# Patient Record
Sex: FEMALE | Race: WHITE | NOT HISPANIC OR LATINO
[De-identification: names, ages, dates, MRNs, and addresses within clinical notes are randomized per-mention and may not be internally consistent; named-entity substitution may affect disease eponyms.]

---

## 2024-09-16 ENCOUNTER — APPOINTMENT (OUTPATIENT)
Dept: ORTHOPEDIC SURGERY | Facility: CLINIC | Age: 69
End: 2024-09-16
Payer: MEDICAID

## 2024-09-16 VITALS — BODY MASS INDEX: 32.57 KG/M2 | HEIGHT: 62 IN | WEIGHT: 177 LBS

## 2024-09-16 DIAGNOSIS — I10 ESSENTIAL (PRIMARY) HYPERTENSION: ICD-10-CM

## 2024-09-16 DIAGNOSIS — E11.9 TYPE 2 DIABETES MELLITUS W/OUT COMPLICATIONS: ICD-10-CM

## 2024-09-16 DIAGNOSIS — Z78.9 OTHER SPECIFIED HEALTH STATUS: ICD-10-CM

## 2024-09-16 DIAGNOSIS — M75.51 BURSITIS OF RIGHT SHOULDER: ICD-10-CM

## 2024-09-16 PROBLEM — Z00.00 ENCOUNTER FOR PREVENTIVE HEALTH EXAMINATION: Status: ACTIVE | Noted: 2024-09-16

## 2024-09-16 PROCEDURE — 73030 X-RAY EXAM OF SHOULDER: CPT | Mod: RT

## 2024-09-16 PROCEDURE — 99203 OFFICE O/P NEW LOW 30 MIN: CPT

## 2024-09-16 PROCEDURE — 73010 X-RAY EXAM OF SHOULDER BLADE: CPT | Mod: RT

## 2024-09-16 RX ORDER — ATORVASTATIN CALCIUM 20 MG/1
20 TABLET, FILM COATED ORAL
Refills: 0 | Status: ACTIVE | COMMUNITY

## 2024-09-16 RX ORDER — METFORMIN HYDROCHLORIDE 1000 MG/1
1000 TABLET, COATED ORAL
Refills: 0 | Status: ACTIVE | COMMUNITY

## 2024-09-16 RX ORDER — LOSARTAN POTASSIUM 100 MG/1
100 TABLET, FILM COATED ORAL
Refills: 0 | Status: ACTIVE | COMMUNITY

## 2024-09-16 RX ORDER — NAPROXEN 500 MG/1
500 TABLET ORAL
Qty: 20 | Refills: 0 | Status: ACTIVE | COMMUNITY
Start: 2024-09-16 | End: 1900-01-01

## 2024-09-16 RX ORDER — ATENOLOL 25 MG/1
25 TABLET ORAL
Refills: 0 | Status: ACTIVE | COMMUNITY

## 2024-09-16 NOTE — HISTORY OF PRESENT ILLNESS
[Sudden] : sudden [9] : 9 [8] : 8 [Sharp] : sharp [Frequent] : frequent [Meds] : meds [de-identified] : This is Ms. JANET VU  a 69 year old female who comes in today complaining of right shoulder pain for one week with no injury.  pain radiates to elbow. no radic sx. pain with certain rom.   no h/o shoulder injury    bp is controlled [] : no

## 2024-09-16 NOTE — DISCUSSION/SUMMARY
[de-identified] : Discussed options, HEP naproxen 500mg BID 5-7 days then prn call for PT rx if no relief in 3-4 weeks  f/u 6-8 weeks ----------------------------------------------------------------------------  Patient warned of specific risks of medication related to bleeding, GI issues, increase blood pressure, and cardiac risks in addition to additional risks.  Patient advised to discuss with PMD  if any presence of stated issues.  ----------------------------------------------------------------------------   All relevant imaging studies pertinent to today's visit, including x-rays, MRI's and/or other advanced imaging studies (CT/etc) were independently interpreted and reviewed with the patient as needed. Implications of the studies together with the patient's clinical picture were discussed to formulate a working diagnosis and management options were detailed.   The patient and/or guardian was advised of the diagnosis.  The natural history of the pathology was explained in full. All questions were answered.  The risks and benefits of conservative and interventional treatment alternatives were explained to the patient  The patient and/or guardian was advised if any advanced diagnostic/imaging study (MRI/CT/etc) is ordered to evaluate potential pathology in the affected area(s), they should follow up in the office to review the results of the study and determine further management that may be indicated.

## 2024-09-16 NOTE — IMAGING
[Right] : right shoulder [AC Joint Arthrosis] : AC Joint Arthrosis [There are no fractures, subluxations or dislocations. No significant abnormalities are seen] : There are no fractures, subluxations or dislocations. No significant abnormalities are seen [de-identified] :   ----------------------------------------------------------------------------   Right shoulder exam:   Skin: no significant pertinent finding Inspection: no obvious deformity, no obvious masses, no swelling, no effusion, no atrophy ROM:    FF: 180    ER: 70    IR: T12 Tenderness:    (+) Anterior/Biceps:    (neg) Posterior    (neg) Lateral    (neg) Trapezius    (neg) Scapula    (neg) AC joint    (neg) Crepitus with ROM Stability:    (neg) Translation    (neg) Apprehension    (neg) Clicking Additional tests:    (+) Neer's    (+)Hawkin's    (neg) Regan's    (neg) Speed    (neg) Cross chest adduction Strength:    FF: 5/5    ER: 5/5    IR: 5/5    Biceps: 5/5    Triceps: 5/5    Distal: 5/5 Neuro: In tact to light touch throughout Vascularity: Extremity warm and well perfused  [FreeTextEntry1] : bursitis, mild AC arthrosis